# Patient Record
Sex: FEMALE | Race: WHITE | NOT HISPANIC OR LATINO | Employment: FULL TIME | URBAN - METROPOLITAN AREA
[De-identification: names, ages, dates, MRNs, and addresses within clinical notes are randomized per-mention and may not be internally consistent; named-entity substitution may affect disease eponyms.]

---

## 2023-12-04 ENCOUNTER — CLINICAL SUPPORT (OUTPATIENT)
Dept: AUDIOLOGY | Facility: CLINIC | Age: 58
End: 2023-12-04

## 2023-12-04 ENCOUNTER — OFFICE VISIT (OUTPATIENT)
Dept: OTOLARYNGOLOGY | Facility: CLINIC | Age: 58
End: 2023-12-04

## 2023-12-04 VITALS
WEIGHT: 149.6 LBS | TEMPERATURE: 97.5 F | BODY MASS INDEX: 27.53 KG/M2 | HEIGHT: 62 IN | SYSTOLIC BLOOD PRESSURE: 146 MMHG | DIASTOLIC BLOOD PRESSURE: 88 MMHG

## 2023-12-04 DIAGNOSIS — R26.81 UNSTEADINESS: ICD-10-CM

## 2023-12-04 DIAGNOSIS — H90.3 ASYMMETRIC SNHL (SENSORINEURAL HEARING LOSS): ICD-10-CM

## 2023-12-04 DIAGNOSIS — R42 DIZZINESS AND GIDDINESS: Primary | ICD-10-CM

## 2023-12-04 DIAGNOSIS — H90.3 ASYMMETRICAL SENSORINEURAL HEARING LOSS: ICD-10-CM

## 2023-12-04 DIAGNOSIS — H93.12 TINNITUS OF LEFT EAR: ICD-10-CM

## 2023-12-04 DIAGNOSIS — R42 DIZZINESS: Primary | ICD-10-CM

## 2023-12-04 PROCEDURE — 92550 TYMPANOMETRY & REFLEX THRESH: CPT | Performed by: AUDIOLOGIST

## 2023-12-04 PROCEDURE — 99214 OFFICE O/P EST MOD 30 MIN: CPT | Performed by: OTOLARYNGOLOGY

## 2023-12-04 PROCEDURE — 1036F TOBACCO NON-USER: CPT | Performed by: OTOLARYNGOLOGY

## 2023-12-04 PROCEDURE — 92557 COMPREHENSIVE HEARING TEST: CPT | Performed by: AUDIOLOGIST

## 2023-12-04 RX ORDER — ERGOCALCIFEROL 1.25 MG/1
CAPSULE ORAL
COMMUNITY
Start: 2021-02-13

## 2023-12-04 RX ORDER — ONDANSETRON 4 MG/1
4 TABLET, ORALLY DISINTEGRATING ORAL EVERY 6 HOURS PRN
COMMUNITY

## 2023-12-04 RX ORDER — MECLIZINE HYDROCHLORIDE 25 MG/1
25 TABLET ORAL 3 TIMES DAILY PRN
COMMUNITY

## 2023-12-04 ASSESSMENT — PAIN SCALES - GENERAL: PAINLEVEL_OUTOF10: 0 - NO PAIN

## 2023-12-04 ASSESSMENT — PAIN - FUNCTIONAL ASSESSMENT: PAIN_FUNCTIONAL_ASSESSMENT: 0-10

## 2023-12-04 ASSESSMENT — ENCOUNTER SYMPTOMS: OCCASIONAL FEELINGS OF UNSTEADINESS: 0

## 2023-12-04 NOTE — PROGRESS NOTES
AUDIOLOGY ADULT AUDIOMETRIC EVALUATION      Name:  Inez Mullins  :  1965  Age:  58 y.o.  Date of Evaluation: 23    History:  Reason for visit:  Ms. Inez Mullins was seen today as part of the visit with Marshal Molina D.O. for an evaluation of hearing.   Chief Complaint   Patient presents with    Hearing Loss    Dizziness     Vertigo     Tinnitus     The patient reported a history of vertigo and tinnitus attacks beginning in 2021. Previous hearing testing performed on 21 indicated a mild to moderate sensorineural hearing loss in the left ear.   Reported within the past week she has experienced the vertigo attacks. Stated the attacks have been intermittent and random and she will experience a sensation like swaying with a heaviness in holding up her head. Reported she will experience dizziness, nausea with some emesis for possibly a few hours at a time.   Stated she has some intermittent left sided tinnitus which may sometimes get louder with the dizziness, but not always.   Reported some occasional earaches in the left ear at times, however, denied any current otalgia.   Denied any otalgia, aural fullness, recent ear/sinus infections, recent falls, or sudden hearing loss.    EVALUATION      See Audiogram    RESULTS:    Otoscopic Evaluation:   Right Ear: Otoscopy for the right ear revealed a clear healthy canal and a healthy tympanic membrane was visualized.   Left Ear: Otoscopy for the left ear revealed a clear healthy canal and a healthy tympanic membrane was visualized.     Immittance:  Immittance Measures: 226 Hz   Right Ear: Tympanometric testing revealed a normal type A tympanogram with normal middle ear pressure and normal static compliance.  Left Ear: Tympanometric testing revealed a normal type A tympanogram with normal middle ear pressure and normal static compliance.    Right Ear: Ipsilateral acoustic reflexes were present at, 500-4,000 Hz, at expected sensation levels.  Left Ear:  Ipsilateral acoustic reflexes were present at, 500-4,000 Hz, at expected sensation levels.    Test technique:  Pure Tone Audiometry via insert earphones    Reliability:   excellent    Pure Tone Audiometry:    Right Ear: Audiometric testing of the right ear using insert earphones indicated normal peripheral hearing sensitivity through 1,000 Hz, sloping to a slight sensorineural hearing loss through 3,000 Hz rising to normal peripheral sensitivity above.  Left Ear:   Audiometric testing of the left ear using insert earphones indicated a moderate to severe essentially sensorineural hearing loss through 1,000 Hz, rising to a moderate sensorineural hearing loss above.       Speech Audiometry:   Right Ear:  Speech Reception Threshold (SRT) was obtained at 20 dBHL                 Word Recognition scores were excellent (100%) in quiet when words were presented at 60 dBHL  Left Ear:  Speech Reception Threshold (SRT) was obtained at 65 dBHL                 Word Recognition scores were very poor (20%) in quiet when words were presented at 95 dBHL  Testing was performed with recorded NU-6 speech words in quiet. Speech thresholds were in good agreement with the pure tone averages in each ear.     IMPRESSIONS:  Today's test results are hearing loss requiring medical/otologic and audiologic follow-up.  The patient was counseled with regard to the findings.  When compared to the previous test results of 11/8/21 there has been essentially no change in the right ear and a slight overall decrease in pure tones and speech recognition in the left ear.    RECOMMENDATIONS:  * Continue medical follow up with Marshal Molina D.O.  * Retest as medically indicated, or sooner if a change in hearing sensitivity or vertigo is noticed.   * Wear hearing protection while in the presence of loud sounds.   * Use tinnitus coping strategies as needed, such as sound apps on a smart phone, utilizing calming noise in the room, running a fan at night, etc.   *  Use effective communication strategies such as asking the speaker to gain attention prior to speaking, speaking in the same room, repeating words that were heard, etc.    PATIENT EDUCATION:   Discussed results and recommendations with the patient and a copy of the hearing test was provided.  Questions were addressed and the patient was encouraged to contact our department should concerns arise.  The patient was seen from 8:30-9:00 am.

## 2023-12-04 NOTE — PROGRESS NOTES
Impression:  1. Dizziness and giddiness        2. Unsteadiness        3. Asymmetrical sensorineural hearing loss        4. Tinnitus of left ear              RECOMMENDATIONS/PLAN :  I explained to the patient that medically her ears look excellent however I would like for her to go see one of our dizzy specialists regarding her persistent unsteadiness/dizziness.  I would also like to refer her for vestibular therapy once again.  We will reach out to Sarah Cross who was her initial nurse practitioner that was treating her dizziness that began in 2015.  We discussed various masking techniques to help cover up the ringing in her ears.  Eventually she may benefit from a cross hearing aid.  All of her concerns were addressed today.      **This electronic medical record note was created with the use of voice recognition software.  Despite proofreading, typographical or grammatical errors may be present that could affect meaning of content **    Subjective   Patient ID:     Inez Mullins is a 58 y.o. female who presents to the office today with a longstanding history of asymmetric sensorineural hearing loss with intermittent unsteadiness/dizziness and occasional true vertigo.  All of her symptoms began in 2015 and she had a previous MRI that did not reveal any sort of retrocochlear masses however it did reveal a meningioma.  She has been seen and treated by Sarah Cross and has done vestibular therapy through the [facility] in the past.  Recently her symptoms have worsened and she has noticed that she has felt lightheaded and unsteady however she has not had true spinning.  She denies any fluctuation in hearing however the ringing in that left ear is somewhat louder.  She denies any recent trauma or any upper respiratory complaints fever or chills.  She underwent gamma knife therapy regarding her meningioma.  She denies any other neurologic complaints today.    ROS:  A detailed 12 system review of systems is  "noted on the intake form has been reviewed with the patient with details noted in the HPI and scanned into the patient's medical record.    Objective     No past medical history on file.     No past surgical history on file.     Allergies   Allergen Reactions    Penicillins Hives    Augmentin [Amoxicillin-Pot Clavulanate] Rash          Current Outpatient Medications:     ergocalciferol (Vitamin D-2) 1.25 MG (82953 UT) capsule, Take by mouth., Disp: , Rfl:     meclizine (Antivert) 25 mg tablet, Take 1 tablet (25 mg) by mouth 3 times a day as needed for dizziness., Disp: , Rfl:     ondansetron ODT (Zofran-ODT) 4 mg disintegrating tablet, Take 1 tablet (4 mg) by mouth every 6 hours if needed for nausea., Disp: , Rfl:      Tobacco Use: Low Risk  (12/4/2023)    Patient History     Smoking Tobacco Use: Never     Smokeless Tobacco Use: Never     Passive Exposure: Not on file        Alcohol Use: Not on file        Social History     Substance and Sexual Activity   Drug Use Not on file        Physical Exam:  Visit Vitals  /88   Temp 36.4 °C (97.5 °F) (Temporal)   Ht 1.575 m (5' 2\")   Wt 67.9 kg (149 lb 9.6 oz)   BMI 27.36 kg/m²   Smoking Status Never   BSA 1.72 m²      General: Patient is alert, oriented, cooperative in no apparent distress.  Head: Normocephalic, atraumatic.  Eyes: PERRL, EOMI, Conjunctiva is clear. No nystagmus.  Ears: Right Ear-- Pinna is normal.  External auditory canal is patent. Tympanic membrane is [intact, translucent and has good mobility with my pneumatic otoscope. No effusion].  Mastoid is nontender.  Left ear-- Pinna is normal.  External auditory canal is patent. Tympanic membrane is [intact, translucent and has good mobility with my pneumatic otoscope.  No effusion].  Mastoid is nontender.  Nose: Septum is straight.  No septal perforation or lesions. No septal hematoma/ seroma.  No signs of bleeding.  Inferior turbinates are normal.   No evidence of intranasal polyps.  No infectious " drainage.  Throat:  Floor of mouth is clear, no masses.  Tongue appears normal, no lesions or masses. Gums, gingiva, buccal mucosa appear pink and moist, no lesions. Teeth are in good repair.  No obvious dental infections.  Peritonsillar regions appear symmetric without swelling.  Hard and soft palate appear normal, no obvious cleft. Uvula is midline.  Oropharynx: No lesions. Retropharyngeal wall is flat.  No active postnasal drip.  Neck: Supple,  no lymphadenopathy.  No masses.  Salivary Glands: Symmetric bilaterally.  No palpable masses.  No evidence of acute infection or salivary stones  Neurologic: Cranial Nerves 2-12 are grossly intact without focal deficits. Cerebellar function testing is normal.     Results:   I reviewed her recent audiogram and her hearing is within normal limits for the right ear and she has a longstanding history of a moderate to severe asymmetric sensorineural loss in her left ear.  Both tympanic membranes have normal mobility.  Word recognition scores 100% in the right ear and 20% in the left ear.  Speech reception threshold is 20 dB in the right ear and 65 dB in the left ear.    Procedure:   []    Marshal Molina, DO

## 2023-12-18 PROBLEM — H93.12 TINNITUS, LEFT EAR: Status: ACTIVE | Noted: 2023-12-18

## 2023-12-18 PROBLEM — H90.42 SENSORINEURAL HEARING LOSS (SNHL) OF LEFT EAR WITH UNRESTRICTED HEARING OF RIGHT EAR: Status: ACTIVE | Noted: 2023-12-18

## 2023-12-18 PROBLEM — H90.3 ASYMMETRICAL SENSORINEURAL HEARING LOSS: Status: ACTIVE | Noted: 2023-12-18

## 2023-12-18 PROBLEM — Z98.890 HX OF LASIK: Status: ACTIVE | Noted: 2020-09-30

## 2023-12-18 PROBLEM — R94.118 ABNORMAL VIDEONYSTAGMOGRAM (VNG): Status: ACTIVE | Noted: 2023-12-18

## 2023-12-18 PROBLEM — D32.9 MENINGIOMA (MULTI): Status: ACTIVE | Noted: 2022-04-18

## 2023-12-18 PROBLEM — R42 DIZZINESS: Status: ACTIVE | Noted: 2021-08-26

## 2023-12-18 RX ORDER — MULTIVITAMIN
1 TABLET ORAL
COMMUNITY
Start: 2015-02-26

## 2023-12-18 RX ORDER — MULTIVIT-MIN/IRON/FOLIC ACID/K 18-600-40
CAPSULE ORAL
COMMUNITY

## 2023-12-18 RX ORDER — THYROID 30 MG/1
30 TABLET ORAL
COMMUNITY
Start: 2021-03-29

## 2023-12-18 RX ORDER — EPINEPHRINE 0.3 MG/.3ML
INJECTION SUBCUTANEOUS
COMMUNITY
Start: 2023-07-21

## 2023-12-18 RX ORDER — MELOXICAM 15 MG/1
15 TABLET ORAL DAILY
COMMUNITY
Start: 2023-04-24 | End: 2023-05-08

## 2023-12-18 RX ORDER — METOCLOPRAMIDE 10 MG/1
10 TABLET ORAL EVERY 6 HOURS PRN
COMMUNITY
Start: 2023-09-09

## 2023-12-18 RX ORDER — BISMUTH SUBSALICYLATE 262 MG
TABLET,CHEWABLE ORAL
COMMUNITY

## 2023-12-18 RX ORDER — ALBUTEROL SULFATE 0.83 MG/ML
2.5 SOLUTION RESPIRATORY (INHALATION)
COMMUNITY
Start: 2015-07-02

## 2023-12-18 RX ORDER — ALBUTEROL SULFATE 90 UG/1
AEROSOL, METERED RESPIRATORY (INHALATION)
COMMUNITY
Start: 2023-07-21

## 2023-12-18 RX ORDER — CHOLECALCIFEROL (VITAMIN D3) 25 MCG
1000 TABLET ORAL
COMMUNITY

## 2023-12-18 RX ORDER — ERYTHROMYCIN 5 MG/G
OINTMENT OPHTHALMIC
COMMUNITY
Start: 2023-01-29

## 2023-12-21 ENCOUNTER — OFFICE VISIT (OUTPATIENT)
Dept: OTOLARYNGOLOGY | Facility: CLINIC | Age: 58
End: 2023-12-21

## 2023-12-21 VITALS
WEIGHT: 148 LBS | HEIGHT: 62 IN | DIASTOLIC BLOOD PRESSURE: 77 MMHG | HEART RATE: 82 BPM | SYSTOLIC BLOOD PRESSURE: 137 MMHG | BODY MASS INDEX: 27.23 KG/M2

## 2023-12-21 DIAGNOSIS — H93.12 TINNITUS OF LEFT EAR: ICD-10-CM

## 2023-12-21 DIAGNOSIS — R42 VERTIGO: ICD-10-CM

## 2023-12-21 DIAGNOSIS — H90.3 ASYMMETRICAL SENSORINEURAL HEARING LOSS: Primary | ICD-10-CM

## 2023-12-21 PROCEDURE — 99214 OFFICE O/P EST MOD 30 MIN: CPT | Performed by: NURSE PRACTITIONER

## 2023-12-21 PROCEDURE — 1036F TOBACCO NON-USER: CPT | Performed by: NURSE PRACTITIONER

## 2023-12-21 SDOH — ECONOMIC STABILITY: FOOD INSECURITY: WITHIN THE PAST 12 MONTHS, THE FOOD YOU BOUGHT JUST DIDN'T LAST AND YOU DIDN'T HAVE MONEY TO GET MORE.: NEVER TRUE

## 2023-12-21 SDOH — ECONOMIC STABILITY: FOOD INSECURITY: WITHIN THE PAST 12 MONTHS, YOU WORRIED THAT YOUR FOOD WOULD RUN OUT BEFORE YOU GOT MONEY TO BUY MORE.: NEVER TRUE

## 2023-12-21 ASSESSMENT — ENCOUNTER SYMPTOMS
LOSS OF SENSATION IN FEET: 0
DEPRESSION: 0
OCCASIONAL FEELINGS OF UNSTEADINESS: 0

## 2023-12-21 ASSESSMENT — COLUMBIA-SUICIDE SEVERITY RATING SCALE - C-SSRS
1. IN THE PAST MONTH, HAVE YOU WISHED YOU WERE DEAD OR WISHED YOU COULD GO TO SLEEP AND NOT WAKE UP?: NO
6. HAVE YOU EVER DONE ANYTHING, STARTED TO DO ANYTHING, OR PREPARED TO DO ANYTHING TO END YOUR LIFE?: NO
2. HAVE YOU ACTUALLY HAD ANY THOUGHTS OF KILLING YOURSELF?: NO

## 2023-12-21 ASSESSMENT — PAIN SCALES - GENERAL: PAINLEVEL: 0-NO PAIN

## 2023-12-21 ASSESSMENT — PATIENT HEALTH QUESTIONNAIRE - PHQ9
2. FEELING DOWN, DEPRESSED OR HOPELESS: NOT AT ALL
SUM OF ALL RESPONSES TO PHQ9 QUESTIONS 1 AND 2: 0
1. LITTLE INTEREST OR PLEASURE IN DOING THINGS: NOT AT ALL

## 2023-12-21 NOTE — PROGRESS NOTES
Subjective   Patient ID: Inez Mullins is a 58 y.o. female who presents for Dizziness.    HPI    INITIAL VISIT 11/8/2021:  Inez Mullins is a 56 year-old female here for evaluation of vertiginous dizziness. She had vertigo after her second COVID vaccine in May. Then in the beginning in August had vertigo after she fell and hit her head. She then got a massage after a fall and then had vertigo after that. She describes it as a spinning sensation sometimes and then other times she feels like she's rocking on a boat. It can be associated with nausea and vomiting. Moving her head back and forth could trigger it. It is usually some type of head movement that triggers it, doesn't happen just when she is just sitting there. Denies photophobia or phonophobia. No autophony. No pressure induced dizziness. No headaches, but sometimes she can have one when she feels dizzy.   Has been noticing HL in the left ear for about 6 years. Tinnitus left ear intermittently. No ear drainage. No ear pain. No ear surgeries.      12/8/2021: Patient following up virtually (audio only) for dizziness and vertigo. She has done so much better since last visit, her last attack was on 11/2/2021. Her ECOG test was normal. She did do 3-4 visits of vestibular therapy through [facility].     1/5/2022: Patient following up virtually regarding her dizziness. It was improving some, but had a few episodes happen. Her ENG showed a significant weakness on the right side, as well as abnormal ocular motility testing. She has not yet seen neurosurgery regarding her meningioma. Was going to vestibular PT.      2/18/2022: Patient following up virtually (audio only). She is having slightly more ringing in the left ear. She's in the office, and wonder if the cold is bothering her. It also feels slightly more full. She is having an ache in the ear. The dizziness has much improved.     12/21/2023: Patient here for her dizziness and vertigo. She is accompanied by her . The  dizziness and vertigo did go away for 1-2 years. In March it started back up again. She then had an episode in the summer. This last month is started more consistently. Starting 2-3 weeks ago it is happening almost everyday. She does take Meclizine. She feels her head could fall off her shoulders and like she is rocking on a boat. She can be throwing up as well. No spinning, but things are moving/tilting.   No changes in her hearing with the vertigo. No tinnitus or fullness with it significantly, maybe a little bit.  She has had the left-sided hearing loss for about 10 years.  She states it happened suddenly after she was traveling in [place] and got sick.  She had no dizziness that she recalls at that time.  Bright lights and loud sounds triggered one episode. No pressure induced dizziness.       Patient Active Problem List   Diagnosis    Tinnitus, left ear    Sensorineural hearing loss (SNHL) of left ear with unrestricted hearing of right ear    Regular astigmatism, left eye    Presbyopia    Optic neuritis    Neck pain    Myopia, bilateral    Meningioma (CMS/HCC)    Hx of LASIK    Dizziness    Backache    Asymmetrical sensorineural hearing loss    Abnormal videonystagmogram (VNG)    Astigmatism of both eyes, unspecified type     No past surgical history on file.    Review of Systems    All other systems have been reviewed and are negative for complaints except for those mentioned in history of present illness, past medical history and problem list.    Objective   Physical Exam    Constitutional: No fever, chills, weight loss or weight gain  General appearance: Appears well, well-nourished, well groomed. No acute distress.    Communication: Normal communication    Psychiatric: Oriented to person, place and time. Normal mood and affect.    Neurologic: Cranial nerves II-XII grossly intact and symmetric bilaterally.    Head and Face:  Head: Atraumatic with no masses, lesions or scarring.  Face: Normal symmetry. No scars  or deformities.  TMJ: Normal, no trismus.    Eyes: Conjunctiva not edematous or erythematous. PERRLA    Right Ear: External inspection of ear with no deformity, scars, or masses. EAC is clear.  TM is intact with no sign of infection, effusion, or retraction.  No perforation seen.     Left Ear: External inspection of ear with no deformity, scars, or masses. EAC is clear.  TM is intact with no sign of infection, effusion, or retraction.  No perforation seen.     Nose: External inspection of nose: No nasal lesions, lacerations or scars. Anterior rhinoscopy with limited visualization past the inferior turbinates. No tenderness on frontal or maxillary sinus palpation.    Oral Cavity/Mouth: Oral cavity and oropharynx mucosa moist and pink. No lesions or masses. Dentition normal. Tonsils appear normal. Uvula is midline. Tongue with no masses or lesions. Tongue with good mobility. The oropharynx is clear.    Neck: Normal appearing, symmetric, trachea midline.     Cardiovascular: Examination of peripheral vascular system shows no clubbing or cyanosis.    Respiratory: No respiratory distress increased work of breathing. Inspection of the chest with symmetric chest expansion and normal respiratory effort.    Skin: No head and neck rashes.    Lymph nodes: No adenopathy.     Diagnostic Results   I personally reviewed audiogram from 12/4/2023 which shows:  Right ear: Normal hearing.  Type A tympanogram.  Excellent recognition score of 100% at 60 dB.  Left ear: Moderately severe sensorineural hearing loss.  Type A tympanogram.  Poor word recognition scores of 20% at 95 dB.    There has been a slight decrease in thresholds since her last audiogram in November 2021.  Word recognition scores in the left ear had decreased from 36% at 85 dB at that time to today of 20% at 95 dB.    12/2/2021:  Screening ABR and ECOG testing were within normal limits. Today's results are not consistent with Meniere's Disease, however other vestibular  pathologies cannot be ruled out.     12/30/2021:  Abnormal VNG examination. Bithermal caloric testing yielded a significant (58%) weakness on the left side. Ocular motility testing to visually guided signals were abnormal, which indicated central findings. Positional and positioning testing ([place]-Hallpike and roll test) were determined to be normal. Overall results suggest the presence of a peripheral (superior portion of the vestibular nerve or lateral semicircular canal) vestibular system disorder affecting the left side. Central findings were also noted with abnormal ocular motility tests.    Previous MRI Brain from was reviewed with Dr. Mike in 2021 which showed no evidence of acoustic neuroma. Showed meningioma which patient saw neurosurgery for.     Assessment/Plan   Diagnoses and all orders for this visit:  Asymmetrical sensorineural hearing loss  Vertigo  Tinnitus of left ear    We had a lengthy discussion regarding patient's symptoms.  The physiology of balance control was explained. The likely possible etiologies were reviewed and the patient was thoroughly evaluated for BPPV, vestibular neuritis/labyrinthitis, vestibular hypofunction, Menieres Disease, and SCCD. I believe the patient does have a peripheral vestibular disorder.   Patient may have had a labyrinthitis 10 years ago when she had a virus accompanied with a sudden hearing loss.  We also discussed that she did have a left-sided vestibular hypofunction on ENG testing done in 2021.  Her symptoms are also consistent with Ménière's disease.  With her episodes of vertigo lasting hours.  With her hearing loss on the left side that may be from progressive Ménière's disease versus labyrinthitis.  She also gets tinnitus and ear fullness at times in the left ear.  We discussed monitoring her sodium.  Education was provided to the patient on this.  We also discussed possibly initiating a diuretic due to her vertigo happening almost daily.  She does not  wish to pursue medication at this time as it is hard for her to start a medication knowing there is no definitive diagnosis.  I explained that Ménière's disease does not have a definitive test and is more based on her symptoms.  We went over all options in detail.  She would like to obtain MRI IAC to further evaluate.  I feel this is reasonable as she has not had a dedicated IAC image.  I did review her brain MRI from 2021 with Dr. Mike at that time.  MRI IAC order was placed and we will review these results with her.  I also provided her with education on triamterene-hydrochlorothiazide and she will think about this.  We also briefly discussed betahistine.    -Avoid triggers that may include high salt intake, caffeine, alcohol, nicotine, stress, monosodium glutamate (MSG), and allergies (food and environmental).  -An appropriate salt-restricted diet will include a maximum of 2 to 3 g of sodium daily, with the daily sodium intake evenly spread across meals to avoid a large bolus at any time  -Limit caffeine and alcohol consumption to one caffeinated beverage (coffee, tea, or cola) and one alcoholic drink daily, particularly if these are triggers for attacks.     All questions answered to patient's satisfaction.    KAROLINA Marcus-CNP 12/21/23 9:38 AM

## 2025-07-28 NOTE — PROGRESS NOTES
ADULT AUDIOLOGY EVALUATION    Name:  Inez Mullins  :  1965  Age:  60 y.o.  Date of Evaluation:  2025     IMPRESSIONS     Today's test results indicate normal to borderline mild sensorineural hearing loss in the right ear, and moderate to moderately-severe sensorineural hearing loss in the left ear. Asymmetry is present at all test frequencies, with greater hearing loss in the left ear. Tympanometry indicates normal middle ear functioning bilaterally. Word recognition is excellent in the right ear, and very poor in the left ear. Compared to previous audiologic evaluation on 23, hearing thresholds are largely stable.     RECOMMENDATIONS     Medical follow up with ENT. Patient is scheduled to see BRENDA Lizarraga directly following today's testing.  Return for annual hearing evaluation or sooner should new concerns arise.    Time: 2754-4691    HISTORY     Inez Mullins is seen today for an audiologic evaluation in conjunction with otolaryngology. Previous audiologic evaluation on 23 revealed normal to mild sensorineural hearing loss in the right ear, and moderate to moderately-severe sensorineural hearing loss in the left ear. Word recognition was excellent in the right ear, and very poor in the left ear. Per chart review, she has a history of meningioma with gamma knife therapy. She underwent vestibular testing in 2021 which revealed a left caloric weakness and abnormal ocular motility tests suggesting both central and left peripheral dysfunction.    Patient reports intermittent dizziness described as rocking and imbalance with associated nausea and emesis lasting up to 3-4 hours at a time. This occurs every 1-2 months and may be further provoked with quick head movements and leaning her head backwards. She has participated in physical therapy for symptoms with some reported benefit. Symptoms have been present for several years.     Inez reports intermittent pain and fullness  "in the left ear that was first noticed following an illness in December 2019 and made worse with cold air blowing near this ear. She recently noticed a \"pinging\" pain in the left ear again last month. Patient endorsed longstanding intermittent left sided ringing tinnitus. She believes this may be associated with her falling down marble steps and bruising the back left side of her head and neck around 10 years ago. Patient feels that her hearing is stable since her last audiogram. She denied history of otologic surgeries.    Of note, patient arrived 15 minutes late to today's visit. Some case history was abbreviated due to time constraints.    TEST RESULTS     Otoscopic Evaluation:  Right Ear: Slight wax in ear canal with full visualization of tympanic membrane  Left Ear: Slight wax in ear canal with full visualization of tympanic membrane    Tympanometry:   Right Ear: Normal, type A tympanogram with normal ear canal volume, peak pressure and compliance.   Left Ear: Normal, type A tympanogram with normal ear canal volume, peak pressure and compliance.     Ipsilateral Acoustic Reflexes:   Right Ear: Present 500-4000 Hz.   Left Ear: Present 500-4000 Hz.     Pure Tone Audiometry (125-8000 Hz):     Right Ear: Normal to borderline mild sensorineural hearing loss from 125-8000 Hz.    Left Ear: Mild to moderately-severe sensorineural hearing loss from 125-8000 Hz.     Asymmetry present from 125-8000 Hz (greater loss in left ear).    Speech Audiometry:   Right Ear:    Speech Reception Threshold (SRT) was obtained at 20 dBHL using recorded material.   Word Recognition scores were excellent (100%) in quiet when words were presented at 55 dBHL using recorded NU-6 word list ordered by difficulty.  Left Ear:    Speech Reception Threshold (SRT) was obtained at 60 dBHL using recorded material.   Word Recognition scores were very poor (16%) in quiet when words were presented at 80 dBHL using recorded NU-6 word list ordered by " difficulty. Of note, patient could not tolerate higher presentation level in this ear.      Previous Test:  Right ear: Hearing thresholds are largely stable compared to previous test from 12/4/23.  Left ear: Hearing thresholds are largely stable compared to previous test from 12/4/23.      PATIENT EDUCATION     Patient was counseled in regard to findings.         Radhika Garcia, CCC-A  Clinical Audiologist    Degree of Hearing Sensitivity Decibel Range   Within Normal Limits (WNL) 0-25   Mild 26-40   Moderate 41-55   Moderately-Severe 56-70   Severe 71-90   Profound 91+      Key   CNT/DNT Could Not Test/Did Not Test   TM Tympanic Membrane   WNL Within Normal Limits   HA Hearing Aid   SNHL Sensorineural Hearing Loss   CHL Conductive Hearing Loss   NIHL Noise-Induced Hearing Loss   ECV Ear Canal Volume   MLV Monitored Live Voice     AUDIOGRAM

## 2025-08-05 ENCOUNTER — APPOINTMENT (OUTPATIENT)
Dept: AUDIOLOGY | Facility: CLINIC | Age: 60
End: 2025-08-05

## 2025-08-05 ENCOUNTER — APPOINTMENT (OUTPATIENT)
Facility: CLINIC | Age: 60
End: 2025-08-05

## 2025-08-05 ENCOUNTER — OFFICE VISIT (OUTPATIENT)
Facility: CLINIC | Age: 60
End: 2025-08-05
Payer: COMMERCIAL

## 2025-08-05 ENCOUNTER — APPOINTMENT (OUTPATIENT)
Facility: CLINIC | Age: 60
End: 2025-08-05
Payer: COMMERCIAL

## 2025-08-05 ENCOUNTER — CLINICAL SUPPORT (OUTPATIENT)
Dept: AUDIOLOGY | Facility: CLINIC | Age: 60
End: 2025-08-05
Payer: COMMERCIAL

## 2025-08-05 VITALS — DIASTOLIC BLOOD PRESSURE: 82 MMHG | SYSTOLIC BLOOD PRESSURE: 143 MMHG

## 2025-08-05 DIAGNOSIS — H90.3 ASYMMETRICAL SENSORINEURAL HEARING LOSS: Primary | ICD-10-CM

## 2025-08-05 DIAGNOSIS — H93.12 TINNITUS OF LEFT EAR: ICD-10-CM

## 2025-08-05 DIAGNOSIS — H92.02 LEFT EAR PAIN: ICD-10-CM

## 2025-08-05 DIAGNOSIS — H93.12 TINNITUS, LEFT EAR: ICD-10-CM

## 2025-08-05 DIAGNOSIS — H61.23 BILATERAL IMPACTED CERUMEN: ICD-10-CM

## 2025-08-05 DIAGNOSIS — R42 DIZZINESS: ICD-10-CM

## 2025-08-05 PROCEDURE — 1036F TOBACCO NON-USER: CPT

## 2025-08-05 PROCEDURE — 69210 REMOVE IMPACTED EAR WAX UNI: CPT

## 2025-08-05 PROCEDURE — 92550 TYMPANOMETRY & REFLEX THRESH: CPT | Mod: 52

## 2025-08-05 PROCEDURE — 99215 OFFICE O/P EST HI 40 MIN: CPT

## 2025-08-05 PROCEDURE — 92557 COMPREHENSIVE HEARING TEST: CPT

## 2025-08-05 NOTE — PROGRESS NOTES
Patient ID: Inez Mullins is a 60 y.o. female who presents for the evaluation of dizziness.     DIAGNOSES/PROBLEMS:  -Left otalgia  -Tinnitus of the left ear  -Asymmetric sensorineural hearing loss (ASNHL)  -TMJ dysfunction  -Bilateral cerumen impaction  -Dizziness    PROVIDER IMPRESSIONS:  ASSESSMENT: Inez Mullins is a pleasant 60 y.o. female with a history of ASNHL, left frontal meningioma s/p gamma knife radiotherapy who presents for initial encounter for symptoms of left otalgia, left non-pulsatile tinnitus, left hearing loss, and episodic, non-vertiginous, and non-positional dizziness. Associated symptoms that are present include habitual bruxism. The physiology of balance control was explained. The likely possible etiologies were reviewed. My impression is that the patient does likely have a peripheral vestibular disorder. Based on the clinical information provided, symptoms and clinical exam findings are consistent with bilateral cerumen impaction, stable ASNHL, and TMJ dysfunction. Using appropriate instrumentation, partially impacted cerumen was successfully removed from the EAC bilaterally. Otologic exam and balance test findings today were overall unremarkable. Reassurance provided to patient that bilateral EAC appears with no sign of acute infection or inflammation and that bilateral TM appears with no sign of infection, effusion, retraction or perforation. I reviewed prior BFT results from December 2021 which showed left vestibular hypofunction that may likely have been from vestibular labyrinthitis. Audiogram from today was reviewed in detail with the patient mild sensorineural hearing loss with excellent (100%) WRS in the right ear, and moderate to moderately-severe SNHL with very poor (16%) WRS in the left ear, with significant left nerve asymmetry across all frequencies. When compared to prior audiogram from December 2023, result have remained essentially stable. The patient was counseled on possible  etiologies for asymmetry, including insult to auditory nerve from the following conditions: viral inflammation, vascular insufficiency, harmful noise exposure, and/or presence of neoplastic disease of the VII/VIII complex. Due to asymmetry in hearing (greater than 55 dB on audiogram), I explained to the patient that imaging is necessary in order to rule out an acoustic neuroma and/or other retrocochlear pathology. Although prior MRI brain w/wo contrast from 2022 was negative for this, I explained that designated MR imaging of the IACs would be reasonable for further symptom workup and to determine candidacy for possible left cochlear implantation which patient expresses interest in obtaining. I discussed with patient that hearing amplification with use of hearing aid would likely not provide hearing benefit in the left ear, given WRS. The patient was offered extensive counseling on the current clinical findings and management recommendations.     PLAN:  I recommended ongoing patient follow-up with established vestibular PT at [facility] and [facility] for further E/M of dizziness and balance retraining. Referral was declined at this time. Patient instructed to contact the facility to schedule for follow-up with vestibular PT.   I recommended MRI IAC w/wo contrast for E/M of ASNHL and possible CI candidacy. Order e-submitted and patient scheduled for imaging today.  I recommended implementing the following lifestyle strategies for TMD symptom management: Initiate a soft diet for the next 2-3 weeks and avoid eating chewy/tough foods such as gum, raw fruits/vegetables, tough meats, or anything else that requires significant mastication. Examples of appropriate soft foods include mashed potatoes, soft pasta, macaroni, yogurt, ice cream, and other things may easily be mashed with a fork. Perform temple and cheekbone massages twice daily for several minutes by using your knuckles to gently massage in circles  near temples and along your cheekbones as tolerated. Apply a warm/cold compress along the entire side of the affected face, directly over TMJ structures, once or twice daily for 20 minutes at a time and remove sooner if skin irritation occurs. Consider purchase of a custom nighttime mouth guard from a dentist to prevent jaw clenching and/or teeth grinding while asleep. If facial pain is present, may use o.t.c.  ibuprofen 600 mg three times a day for three days only with meals, advised to monitor for side effects of upset stomach and ulcers if ibuprofen is taken on an empty stomach and advised to avoid NSAIDs if previously deemed as contraindicated.   Follow-up: Patient may schedule for follow-up virtually after obtaining MRI IAC w/wo contrast to review the results and determine possible left CI surgical candidacy. Patient may follow up sooner, if needed. Patient is agreeable to plan and all questions answered to patient's satisfaction.     At today's visit with Inez Mullins, the number of minutes I spent providing patient care was 48. More than 50% of that time was spent counseling the patient on possible etiologies, test results, treatment options and care coordination.     Subjective   HPI: Inez Mullins is a 60 y.o. female who presents for initial evaluation for multiple ear complaints. She has noticed left ear pain x 1 month which originally felt more continuous and has gradually improved with more intermittent presentation since onset. Describes otalgia as a dull-aching sensation in the left ear, with brief intermittent episodes of sharp/twinges of pain which radiates down the left side of her neck. Patient rates current left ear pain a 0/10 on numeric pain scale.   Patient reports a longstanding history of ASNHL (left>right) since 2015. States that 10 yrs ago she noticed sudden left hearing loss and left non-pulsatile tinnitus. Mentions she fell down stairs while in [place] and after she came down with multiple  URIs in the time preceding onset of left tinnitus and hearing loss. Describes tinnitus as non-pulsatile ringing type sound. She states that current hearing in the left ear is non-functional and relies on the right ear for hearing. She denies any current/prior use of hearing aids. She does not notice fluctuating exacerbations for hearing loss or tinnitus symptoms that coincide with dizziness episodes.   She states that in May 2021, she experienced initial onset of dizziness symptoms with recurrent episodes ever since then. Mentions she received her second COVID-19 vaccination immediately preceding initial dizziness symptom onset. She reports that she experiences episodes of non-vertiginous dysequilibrium that she describes as a rocking/unsteadiness sensation. Episodes of dizziness typically can last for 20 min- 5 hrs consecutively. Reports that in the past 8 months, she has experienced 5 milder dizziness episodes lasting 20-30 min each, and 1 severe dizziness episode which occurred last Sunday and lasted for approximately 30-45 minutes. She reports associated symptoms of nausea and at times vomiting episodes. Patient denies true vertigo symptoms. She denies presyncopal sensation due to dizziness. Patient states that symptoms can occur while at rest. She does not endorse specific head-positional movements to the left or to the right side that trigger the onset of dizziness. Denies the presence of ear fullness/pressure, phono-photophobia, visual/motion intolerance, sound/pressure-induced symptoms, ear drainage, or autophony.  Patient denies any past medical history of ear surgeries, PE tube placement, recent falls, or exposure to ototoxic drugs/agents. Patient denies a history of prolonged/traumatic noise exposure. Patient denies a family history of hearing loss. When asked about any past medical history of migraines, chronic neck tightness/injury, visual impairment, peripheral neuropathy, or autoimmune conditions, the  patient admits to none. Reports other history of habitual bruxism, mentions that in the past 1 yr she has noticed right-sided jaw popping and has been told by her dentist her teeth show signs of wearing from grinding.  Patient previously seen by my ENT partners Dr. Marshal Molina and most recently with Sarah Cross CNP for symptoms from [id number]. Previous workup included audiogram evaluations, VNG/vHIT testing (12/30/21) with findings of left-sided vestibular hypofunction, ABR screening, and ECOG testing (12/2/21) which was normal. She underwent MRI brain in 10/2022 at [facility] that was negative for findings of acoustic neuroma but did show a left frontal meningioma which she saw neurology s/p gamma knife procedure in April 2022. She has not received designated imaging with MRI of IACs. She has previously been to vestibular PT through [facility] and [facility] with symptom benefit.      PATIENT HISTORY:  Medical History[1]   Surgical History[2]   RX Allergies[3]   Current Medications[4]   Tobacco Use: Low Risk  (12/21/2023)    Patient History     Smoking Tobacco Use: Never     Smokeless Tobacco Use: Never     Passive Exposure: Not on file      Alcohol Use: Not At Risk (10/1/2020)    Received from [facility]    AUDIT-C     Frequency of Alcohol Consumption: Monthly or less     Average Number of Drinks: 1 or 2     Frequency of Binge Drinking: Never      Social History     Substance and Sexual Activity   Drug Use Never        REVIEW OF SYSTEMS:   All other systems negative.    Objective   Visit Vitals  Smoking Status Never      PHYSICAL EXAM:  BALANCE TESTING:   Head Thrust Test: Normal  Romberg: Normal   Fukuda: Normal  Tandem gait: Normal       Right Ear: External inspection of ear with no deformity, scars, or masses. EAC is partially impacted with cerumen. Unable to fully visualize TM.   Left Ear: External inspection of ear with no deformity, scars, or masses. EAC is partially impacted with  cerumen. Unable to fully visualize TM.   TMJ: Mild TMJ crepitus (left>right) with jaw-opening maneuver on bimanual palpation, no trismus.  Oral Cavity/Mouth: Oral cavity and oropharynx mucosa moist and pink. No lesions or masses. Dentition normal. Tonsils appear normal. Uvula is midline. Tongue with no masses or lesions. Tongue with good mobility. The oropharynx is clear.  Nose: External inspection of nose: No nasal lesions, lacerations or scars.   Head: Atraumatic with no masses, lesions or scarring.  Face: Normal symmetry. No scars or deformities.  Constitutional: No fever, chills, weight loss or weight gain  General appearance: Appears well, well-nourished, well groomed. No acute distress.   Communication: Normal communication  Psychiatric: Oriented to person, place and time. Normal mood and affect.  Neurologic: Cranial nerves II-XII grossly intact and symmetric bilaterally.  Eyes: Conjunctiva not edematous or erythematous. PERRLA  Neck: Normal appearing, symmetric, trachea midline.   Cardiovascular: Examination of peripheral vascular system shows no clubbing or cyanosis.  Respiratory: No respiratory distress increased work of breathing. Inspection of the chest with symmetric chest expansion and normal respiratory effort.  Skin: No head and neck rashes.  Lymph nodes: No adenopathy.    EAR CLEANING PROCEDURE NOTE:  Indication: Cerumen impaction  Location: bilateral ear canals  Procedure Note: The procedure was performed by the provider.  Visualization Instrument: A microscope with a #5 speculum was placed in the ear canals to visualize the ear canal debris.  Ear Cleaning Instrument and Outcome: Using the 5 suction, a small amount of soft, yellow cerumen was removed from the impacted EAC(s).  Patient Status: The patient tolerated the procedure well.  Complications: There were no complications.  Post-Procedure/Microscopic Otologic Exam:  Right Ear--EAC is clear. TM is intact with no sign of infection, effusion, or  retraction.  No perforation seen. Auto insufflation visible under microscopy.  Left Ear-- EAC is clear. TM is intact with no sign of infection, effusion, or retraction.  No perforation seen. Auto insufflation visible under microscopy.       RESULTS:  -I personally reviewed the patient's audiogram from 08/05/25, and this is my own interpretation of findings: Right ear with normal hearing through 250 Hz, sloping to a mild sensorineural hearing loss through 4000 Hz, rising to normal hearing above. Left ear with moderate to moderately-severe and essentially flat sensorineural hearing loss across all frequencies. There is notable right nerve asymmetry across all frequencies with 30-55 dB difference. Right ear with normal tympanogram, and left ear with normal tympanogram. Right ear with 100% WRS at 55 dB, and left ear with 16% WRS at 80 dB. Acoustic reflexes present right ipsilateral, and present left ipsilateral.  When compared to prior audiogram from 12/4/23, results have remained essentially stable in the ears bilaterally.    -I reviewed the results report for patient's BFTs from 12/30/2021 with the following findings reported: Abnormal VNG examination. Bithermal caloric testing yielded a significant (58%) weakness on the left side. Ocular motility testing to visually guided signals were abnormal, which indicated central findings. Positional and positioning testing (Jerilyn-Hallpike and roll test) were determined to be normal. Overall results suggest the presence of a peripheral (superior portion of the vestibular nerve or lateral semicircular canal) vestibular system disorder affecting the left side. Central findings were also noted with abnormal ocular motility tests.      Dora Fisher, APRN-CNP          [1] No past medical history on file.  [2] No past surgical history on file.  [3]   Allergies  Allergen Reactions    Penicillins Hives, Rash and Unknown    Amoxicillin Unknown    Cat Dander Itching    Cat's Claw  Itching    House Dust Other     sneezing    Mold Other     sneezing    Penicillin Hives    Pollen Extracts Other     sneezing    Amoxicillin-Pot Clavulanate Rash   [4]   Current Outpatient Medications:     albuterol 2.5 mg /3 mL (0.083 %) nebulizer solution, Inhale 3 mL (2.5 mg)., Disp: , Rfl:     albuterol 90 mcg/actuation inhaler, INHALE 2 PUFFS EVERY 6 HOURS AS NEEDED AS DIRECTED, Disp: , Rfl:     ascorbic acid, vitamin C, 500 mg capsule, Take by mouth., Disp: , Rfl:     cholecalciferol (Vitamin D-3) 25 MCG (1000 UT) tablet, Take 1 tablet (1,000 Units) by mouth once daily., Disp: , Rfl:     EPINEPHrine 0.3 mg/0.3 mL injection syringe, INJECT 0.3 ML INTRAMUSCULARLY AS NEEDED., Disp: , Rfl:     ergocalciferol (Vitamin D-2) 1.25 MG (78121 UT) capsule, Take by mouth., Disp: , Rfl:     erythromycin (Romycin) 5 mg/gram (0.5 %) ophthalmic ointment, APPLY 1 CM RIBBON INTO THE LOWER CONJUNCTIVAL SAC(S) IN THE AFFECTED EYE(S) 3 TIMES PER DAY, Disp: , Rfl:     meclizine (Antivert) 25 mg tablet, Take 1 tablet (25 mg) by mouth 3 times a day as needed for dizziness., Disp: , Rfl:     metoclopramide (Reglan) 10 mg tablet, Take 1 tablet (10 mg) by mouth every 6 hours if needed., Disp: , Rfl:     multivitamin tablet, Take 1 tablet by mouth once daily., Disp: , Rfl:     multivitamin tablet, Take by mouth., Disp: , Rfl:     ondansetron ODT (Zofran-ODT) 4 mg disintegrating tablet, Take 1 tablet (4 mg) by mouth every 6 hours if needed for nausea., Disp: , Rfl:     thyroid, pork, ([place] Thyroid) 30 mg tablet, Take 1 tablet (30 mg) by mouth., Disp: , Rfl:

## 2025-08-20 ENCOUNTER — APPOINTMENT (OUTPATIENT)
Dept: RADIOLOGY | Facility: CLINIC | Age: 60
End: 2025-08-20
Payer: COMMERCIAL

## 2025-08-22 ENCOUNTER — APPOINTMENT (OUTPATIENT)
Facility: CLINIC | Age: 60
End: 2025-08-22
Payer: COMMERCIAL

## 2025-08-28 ENCOUNTER — APPOINTMENT (OUTPATIENT)
Dept: RADIOLOGY | Facility: CLINIC | Age: 60
End: 2025-08-28
Payer: COMMERCIAL

## 2025-08-28 ENCOUNTER — APPOINTMENT (OUTPATIENT)
Facility: CLINIC | Age: 60
End: 2025-08-28
Payer: COMMERCIAL

## 2025-08-28 DIAGNOSIS — H90.3 ASYMMETRICAL SENSORINEURAL HEARING LOSS: ICD-10-CM

## 2025-08-28 DIAGNOSIS — H93.12 TINNITUS OF LEFT EAR: ICD-10-CM

## 2025-08-28 PROCEDURE — A9575 INJ GADOTERATE MEGLUMI 0.1ML: HCPCS

## 2025-08-28 PROCEDURE — 70553 MRI BRAIN STEM W/O & W/DYE: CPT

## 2025-08-28 PROCEDURE — 2550000001 HC RX 255 CONTRASTS

## 2025-08-28 RX ORDER — GADOTERATE MEGLUMINE 376.9 MG/ML
0.2 INJECTION INTRAVENOUS
Status: COMPLETED | OUTPATIENT
Start: 2025-08-28 | End: 2025-08-28

## 2025-08-28 RX ADMIN — GADOTERATE MEGLUMINE 13.5 ML: 376.9 INJECTION INTRAVENOUS at 10:17

## 2025-09-05 ENCOUNTER — APPOINTMENT (OUTPATIENT)
Facility: CLINIC | Age: 60
End: 2025-09-05
Payer: COMMERCIAL

## 2025-10-02 ENCOUNTER — APPOINTMENT (OUTPATIENT)
Dept: ORTHOPEDIC SURGERY | Facility: CLINIC | Age: 60
End: 2025-10-02
Payer: COMMERCIAL

## 2026-06-15 ENCOUNTER — APPOINTMENT (OUTPATIENT)
Facility: CLINIC | Age: 61
End: 2026-06-15